# Patient Record
Sex: FEMALE | Race: WHITE | NOT HISPANIC OR LATINO
[De-identification: names, ages, dates, MRNs, and addresses within clinical notes are randomized per-mention and may not be internally consistent; named-entity substitution may affect disease eponyms.]

---

## 2019-01-22 PROBLEM — Z00.00 ENCOUNTER FOR PREVENTIVE HEALTH EXAMINATION: Status: ACTIVE | Noted: 2019-01-22

## 2019-02-04 ENCOUNTER — APPOINTMENT (OUTPATIENT)
Dept: ORTHOPEDIC SURGERY | Facility: CLINIC | Age: 54
End: 2019-02-04
Payer: COMMERCIAL

## 2019-02-04 VITALS
WEIGHT: 170 LBS | DIASTOLIC BLOOD PRESSURE: 94 MMHG | BODY MASS INDEX: 34.27 KG/M2 | HEART RATE: 84 BPM | HEIGHT: 59 IN | SYSTOLIC BLOOD PRESSURE: 155 MMHG

## 2019-02-04 DIAGNOSIS — M22.2X1 PATELLOFEMORAL DISORDERS, RIGHT KNEE: ICD-10-CM

## 2019-02-04 PROCEDURE — 73562 X-RAY EXAM OF KNEE 3: CPT | Mod: RT

## 2019-02-04 PROCEDURE — 99203 OFFICE O/P NEW LOW 30 MIN: CPT

## 2021-04-10 ENCOUNTER — TRANSCRIPTION ENCOUNTER (OUTPATIENT)
Age: 56
End: 2021-04-10

## 2021-04-12 ENCOUNTER — EMERGENCY (EMERGENCY)
Facility: HOSPITAL | Age: 56
LOS: 1 days | Discharge: HOME | End: 2021-04-12
Attending: STUDENT IN AN ORGANIZED HEALTH CARE EDUCATION/TRAINING PROGRAM
Payer: COMMERCIAL

## 2021-04-12 VITALS
SYSTOLIC BLOOD PRESSURE: 172 MMHG | TEMPERATURE: 98 F | HEART RATE: 75 BPM | OXYGEN SATURATION: 98 % | RESPIRATION RATE: 20 BRPM | HEIGHT: 59 IN | WEIGHT: 179.9 LBS | DIASTOLIC BLOOD PRESSURE: 97 MMHG

## 2021-04-12 PROCEDURE — 99284 EMERGENCY DEPT VISIT MOD MDM: CPT

## 2021-04-12 PROCEDURE — 73030 X-RAY EXAM OF SHOULDER: CPT | Mod: 26,LT

## 2021-04-12 PROCEDURE — 73030 X-RAY EXAM OF SHOULDER: CPT

## 2021-04-12 PROCEDURE — 99283 EMERGENCY DEPT VISIT LOW MDM: CPT | Mod: 25

## 2021-04-12 RX ORDER — METHOCARBAMOL 500 MG/1
2 TABLET, FILM COATED ORAL
Qty: 20 | Refills: 0
Start: 2021-04-12 | End: 2021-04-16

## 2021-04-12 RX ADMIN — Medication 60 MILLIGRAM(S): at 14:17

## 2021-04-12 NOTE — ED PROVIDER NOTE - CARE PROVIDER_API CALL
Elbert Becerra)  Orthopaedic Surgery  [address], [address] [facility] [address]  [place] [address]  Phone: [phone]  Fax: [phone]  Follow Up Time:

## 2021-04-12 NOTE — ED PROVIDER NOTE - OBJECTIVE STATEMENT
Patient is a 55 year old female who presents c/o left sided neck pain radiating to arm. patient states pain started on Saturday, no injury recalled.  patient states pain increasing since.   patient was seen at  given muscle relaxant and Motrin, not taking motrin. patient states pain on abduction of left shoulder, unable to fully abduct due to pain.  patient denies chest pain, no dyspnea, no no WINTER

## 2021-04-12 NOTE — ED PROVIDER NOTE - PATIENT PORTAL LINK FT
You can access the FollowMyHealth Patient Portal offered by [facility] by registering at the following website: [email or web address]. By joining [employer]’s FollowMyHealth portal, you will also be able to view your health information using other applications (apps) compatible with our system.

## 2021-04-12 NOTE — ED PROVIDER NOTE - ATTENDING CONTRIBUTION TO CARE
56yo female with pmh of depression presents with left neck pain for 3 days. Pt states the pain is worse with movment of arm, and it comes from neck down the arm, paresthesias as well. Pt went to  was given muscle relaxer and motrin. Pt sttes the pain is mainly when raising arm. Pt denies fevers/chills, ha, loc, focal neuro deficits, cp/sob/palp, cough, abd pain/n/v/d, urinary symptoms, recent travel and sick contacts.  Const: Awake, alert and oriented. In no acute distress. Well appearing.  HEENT: NC/AT. Moist mucous membranes.  Eyes: No scleral icterus. EOMI.  Neck:. Soft and supple. LROM due to pain  Cardiac: Regular rate and regular rhythm. +S1/S2. Peripheral pulses 2+ and symmetric. No LE edema.  Resp: Speaking in full sentences. No evidence of respiratory distress. No wheezes, rales or rhonchi.  Abd: Soft, non-tender, non-distended. Normal bowel sounds in all 4 quadrants. No guarding or rebound.  Msk: Spine midline and non-tender. No CVAT. ttp along trapezius muscle pain on abduction, abduction to 90 degrees   Skin: No rashes, abrasions or lacerations.  Lymph: No cervical lymphadenopathy.  Neuro: Awake, alert & oriented x 3. Moves all extremities symmetrically.  analgesics and reassess, no acute findings on xray

## 2021-04-12 NOTE — ED PROVIDER NOTE - NSFOLLOWUPCLINICS_GEN_ALL_ED_FT
[facility] - [place]  Neurosurgery/Spine  [address], [address]  [place] [address]  Phone: [phone]  Fax:     [facility] - [facility]  Neurosurgery/Spine  [address] [place] [address]  Phone: [phone]  Fax:

## 2021-04-12 NOTE — ED PROVIDER NOTE - PHYSICAL EXAMINATION
positive tenderness along trapezius muscle   positive pain on ROM of neck     left shoulder   pain on abduction, abduction to 90 degrees    sensation intact to UE, strength 5/5

## 2021-04-12 NOTE — ED PROVIDER NOTE - CLINICAL SUMMARY MEDICAL DECISION MAKING FREE TEXT BOX
55 year old female with left sided neck and shoulder pain, pain on ROM and palpation   will xray shoulder

## 2021-04-13 ENCOUNTER — APPOINTMENT (OUTPATIENT)
Dept: PHYSICAL MEDICINE AND REHAB | Facility: CLINIC | Age: 56
End: 2021-04-13
Payer: COMMERCIAL

## 2021-04-13 ENCOUNTER — TRANSCRIPTION ENCOUNTER (OUTPATIENT)
Age: 56
End: 2021-04-13

## 2021-04-13 VITALS
SYSTOLIC BLOOD PRESSURE: 169 MMHG | DIASTOLIC BLOOD PRESSURE: 94 MMHG | HEIGHT: 59 IN | HEART RATE: 72 BPM | RESPIRATION RATE: 16 BRPM | WEIGHT: 180 LBS | TEMPERATURE: 98 F | BODY MASS INDEX: 36.29 KG/M2 | OXYGEN SATURATION: 100 %

## 2021-04-13 DIAGNOSIS — Z78.9 OTHER SPECIFIED HEALTH STATUS: ICD-10-CM

## 2021-04-13 DIAGNOSIS — Z86.79 PERSONAL HISTORY OF OTHER DISEASES OF THE CIRCULATORY SYSTEM: ICD-10-CM

## 2021-04-13 DIAGNOSIS — Z82.62 FAMILY HISTORY OF OSTEOPOROSIS: ICD-10-CM

## 2021-04-13 PROCEDURE — 99204 OFFICE O/P NEW MOD 45 MIN: CPT

## 2021-04-13 PROCEDURE — 99072 ADDL SUPL MATRL&STAF TM PHE: CPT

## 2021-04-13 RX ORDER — LEVOTHYROXINE SODIUM 0.05 MG/1
50 TABLET ORAL
Refills: 0 | Status: ACTIVE | COMMUNITY

## 2021-04-13 RX ORDER — LEVOTHYROXINE SODIUM 0.17 MG/1
TABLET ORAL
Refills: 0 | Status: DISCONTINUED | COMMUNITY
End: 2021-04-13

## 2021-04-13 RX ORDER — BACILLUS COAGULANS/INULIN 1B-250 MG
CAPSULE ORAL
Refills: 0 | Status: ACTIVE | COMMUNITY

## 2021-04-13 RX ORDER — FLUOXETINE HCL 10 MG
TABLET ORAL
Refills: 0 | Status: DISCONTINUED | COMMUNITY
End: 2021-04-13

## 2021-04-13 RX ORDER — FLUOXETINE HYDROCHLORIDE 20 MG/1
20 CAPSULE ORAL
Refills: 0 | Status: ACTIVE | COMMUNITY

## 2021-04-13 NOTE — HISTORY OF PRESENT ILLNESS
[FreeTextEntry1] : 55 y.o. F w/ new onset HTN presents to office w/ c/o left periscapular pain radiating to left shoulder down arm to elbow w/ tingling in 4th/5th digits since last week.  No triggering event or antecedent trauma.  Denies similar sxs right hand.  Pt. went to  on Saturday and was Rx'd SMR then went to [facility] ED and was Rx'd oral prednisone.  C-spine x-rays done.  No MRI.  No P.T. or injections.

## 2021-04-13 NOTE — ASSESSMENT
[FreeTextEntry1] : 55 y.o. F w/ presumed left cervical radiculopathy w/ significant RC weakness.  I spent most of today's visit (30 min) discussing the likely pathogenesis and further non-operative management of the patient's painful spinal condition.  I cautioned patient about taking oral prednisone 2' her recent onset HTN.  She was advised to see her PMD ASAP for consideration of starting antihypertensive med.  May trial low dose gabapentin 300 mg po qhs as tolerated.  Advised to watch for CNS depression/sedation.  Given the patient's profound RC weakness w/o impingement, we will obtain MRI C-spine w/o to r/o cervical HNP/stenosis.  Depending on results of MRI, will then consider starting P.T. vs. SUMI vs. surgical consult.  Pt. is in agreement with plan.  All questions answered.  RTC next week for review of imaging.

## 2021-04-13 NOTE — PHYSICAL EXAM
[FreeTextEntry1] : NAD\par A&Ox3\par Obese\par C-spine ROM: 1 FB chin-to-sternum; 20' extension; 45' R LR; 50' L LR\par Peñaloza's: neg\par Lhermitte's: neg\par Spurling's: neg\par DTR's: depressed B/T/Br\par MMT: 4-/5 LEFT SS/IS/D; 4+/5 Biceps; 5/5 triceps; 5-/5 WrExt\par Sensation: SILT.  Decreased PP LEFT C5 dermatome\par Palpation: diffuse left cervical and sup trap muscle hypertonicity/spasm\par ROM LEFT Shoulder: 160' FF/ABD w/o pain\par Neer's: neg\par Hawkin's: neg\par Drop Arm: neg\par Scarf: deferred\par  \par

## 2021-04-19 ENCOUNTER — NON-APPOINTMENT (OUTPATIENT)
Age: 56
End: 2021-04-19

## 2021-04-22 ENCOUNTER — APPOINTMENT (OUTPATIENT)
Dept: PHYSICAL MEDICINE AND REHAB | Facility: CLINIC | Age: 56
End: 2021-04-22

## 2021-04-29 ENCOUNTER — APPOINTMENT (OUTPATIENT)
Dept: PHYSICAL MEDICINE AND REHAB | Facility: CLINIC | Age: 56
End: 2021-04-29
Payer: COMMERCIAL

## 2021-04-29 VITALS
WEIGHT: 197 LBS | HEIGHT: 59 IN | DIASTOLIC BLOOD PRESSURE: 91 MMHG | RESPIRATION RATE: 14 BRPM | HEART RATE: 74 BPM | OXYGEN SATURATION: 100 % | TEMPERATURE: 98 F | BODY MASS INDEX: 39.72 KG/M2 | SYSTOLIC BLOOD PRESSURE: 154 MMHG

## 2021-04-29 PROCEDURE — 99072 ADDL SUPL MATRL&STAF TM PHE: CPT

## 2021-04-29 PROCEDURE — 99214 OFFICE O/P EST MOD 30 MIN: CPT

## 2021-04-29 RX ORDER — PREDNISONE 50 MG/1
50 TABLET ORAL
Refills: 0 | Status: DISCONTINUED | COMMUNITY
End: 2021-04-29

## 2021-04-29 NOTE — HISTORY OF PRESENT ILLNESS
[FreeTextEntry1] : Ms. NIMA DUDLEY  is a 55 year old female who presents with neck pain. Patient referred by Dr. De La Fuente for consideration of AMY. \par \par Location: Left side of neck\par Onset:Started early 4/2021. Has improved with time. \par Provocation/Palliative:Worse with movement. Better with ice. \par Quality:Aching pain\par Radiation:To left shoulder down to 4th/5th digit\par Severity:3/10\par Timing:Worse in AMs and Evenings, better in midday\par \par Does admit to some numbness over L shoulder but improving.  Denies any associated arm or hand weakness. Denies any loss of bowel/bladder control or any groin numbness.\par Previous medications trialed:Oral steroids, Gabapentin 300mg Qhs with good relief, Mobic\par Previous procedures relevant to complaint:None\par Has tried conservative treatment?:No recent PT\par

## 2021-04-29 NOTE — PHYSICAL EXAM
[FreeTextEntry1] : PE:\par Constitutional: In NAD, calm and cooperative\par HEENT: NCAT, Anicteric sclera, no lid-lag\par Cardio: Extremities appear pink and well perfused, no peripheral edema\par Respiratory: Normal respiratory effort on room air, no accessory muscle use\par Skin: no rashes seen on exposed skin, no visible abrasions\par Psych: Normal affect, intact judgment and insight\par Neuro: Awake, alert and oriented x 3, see below for focused neurological exam\par MSK (Neck):\par 	Inspection: no gross swelling identified\par 	Palpation: Tenderness of the left lower cervical paraspinals\par 	ROM: Pain at end cervical extension more than flexion\par 	Strength: 5/5 strength in bilateral upper extremities except for L shoulder abduction which is 4+/5 due to pain\par 	Reflexes: 1+ Biceps/Brachioradialis reflex bilaterally, Peñaloza’s negative bilaterally\par 	Sensation: Intact to light touch in bilateral upper extremities\par 	Special tests: Spurling’s test equivocal on left, negative on right

## 2021-04-29 NOTE — ASSESSMENT
[FreeTextEntry1] : Ms. NIMA DUDLEY is a 55 year old female who presents with left sided neck and UE pain consistent with a cervical radiculopathy, but much improved over past few weeks with Gabapentin and Mobic. Denies any red flag signs. Will recommend:\par - Discussed with patient the risks (including but not limited to bleeding, infection, nerve damage, etc), benefits and alternatives to an epidural steroid injection for which patient understands. She would like to hold off on the injection for now as she tries PT and continues medications\par - PT 2-3x/week for stretching, strengthening, ROM exercises, HEP and modalities PRN including myofascial release, moist heat, and TENS therapy \par - Will continue Gabapentin/Mobic. Patient aware of side effects. \par \par RTC in 3 weeks. Patient educated on red flag signs including any changes to their bowel/bladder control, groin numbness or new weakness. Patient knows to seek immediate attention by calling 911 or going to nearest ER if these symptoms appear. \par \par I spent a total of 32 minutes on this encounter including documentation, face-to-face time and reviewing prior records.

## 2021-05-04 ENCOUNTER — APPOINTMENT (OUTPATIENT)
Dept: PHYSICAL MEDICINE AND REHAB | Facility: CLINIC | Age: 56
End: 2021-05-04

## 2021-05-20 ENCOUNTER — APPOINTMENT (OUTPATIENT)
Dept: PHYSICAL MEDICINE AND REHAB | Facility: CLINIC | Age: 56
End: 2021-05-20
Payer: COMMERCIAL

## 2021-05-20 VITALS
HEART RATE: 80 BPM | HEIGHT: 59 IN | RESPIRATION RATE: 14 BRPM | WEIGHT: 197 LBS | SYSTOLIC BLOOD PRESSURE: 131 MMHG | TEMPERATURE: 98 F | BODY MASS INDEX: 39.72 KG/M2 | OXYGEN SATURATION: 100 % | DIASTOLIC BLOOD PRESSURE: 84 MMHG

## 2021-05-20 DIAGNOSIS — M54.12 RADICULOPATHY, CERVICAL REGION: ICD-10-CM

## 2021-05-20 DIAGNOSIS — M47.812 SPONDYLOSIS W/OUT MYELOPATHY OR RADICULOPATHY, CERVICAL REGION: ICD-10-CM

## 2021-05-20 DIAGNOSIS — M62.838 OTHER MUSCLE SPASM: ICD-10-CM

## 2021-05-20 PROCEDURE — 99072 ADDL SUPL MATRL&STAF TM PHE: CPT

## 2021-05-20 PROCEDURE — 99213 OFFICE O/P EST LOW 20 MIN: CPT

## 2021-05-20 RX ORDER — GABAPENTIN 300 MG/1
300 CAPSULE ORAL
Qty: 30 | Refills: 2 | Status: DISCONTINUED | COMMUNITY
Start: 2021-04-13 | End: 2021-05-20

## 2021-05-20 RX ORDER — MELOXICAM 7.5 MG/1
7.5 TABLET ORAL
Qty: 30 | Refills: 1 | Status: DISCONTINUED | COMMUNITY
Start: 2021-04-16 | End: 2021-05-20

## 2021-05-20 NOTE — HISTORY OF PRESENT ILLNESS
[FreeTextEntry1] : Ms. NIMA DUDLEY is a 55 year old  female who presents for follow up. At last visit, she was started on PT, and continued on Gabapentin/Mobic. She is no longer on gabapentin/mobic as she does not require it. Only complaint is some mild ROM difficulty with her neck/L shoulder. \par \par Location: Left side of neck\par Onset:Started early 4/2021. Has improved with time. \par Provocation/Palliative:Worse with movement. Better with ice. \par Quality:Aching pain\par Radiation:To left shoulder down to 4th/5th digit\par Severity:No pain at this time\par Timing:Worse in AMs and Evenings, better in midday\par \par No bowel/bladder changes. No groin numbness.

## 2021-05-20 NOTE — PHYSICAL EXAM
[FreeTextEntry1] : PE:\par Constitutional: In NAD, calm and cooperative\par HEENT: NCAT, Anicteric sclera, no lid-lag\par Cardio: Extremities appear pink and well perfused, no peripheral edema\par Respiratory: Normal respiratory effort on room air, no accessory muscle use\par Skin: no rashes seen on exposed skin, no visible abrasions\par Psych: Normal affect, intact judgment and insight\par Neuro: Awake, alert and oriented x 3, see below for focused neurological exam\par MSK (Neck):\par 	Inspection: no gross swelling identified\par 	Palpation: No tenderness of the left  cervical paraspinals\par 	ROM: Minimal pain at end cervical extension more than flexion\par 	Strength: 5/5 strength in bilateral upper extremities \par 	Reflexes: 1+ Biceps/Brachioradialis reflex bilaterally, Peñaloza’s negative bilaterally\par 	Sensation: Intact to light touch in bilateral upper extremities\par 	Special tests: Spurling’s test negative bilaterally

## 2021-05-20 NOTE — ASSESSMENT
[FreeTextEntry1] : Ms. NIMA DUDLEY is a 55 year old female who presents with previous left sided neck and UE pain consistent with a cervical radiculopathy, but much improved over past few weeks with PT. Denies any pain at this time, just some mild ROM restrictions. Denies any red flag signs. Will recommend:\par - Continue PT 2-3x/week for stretching, strengthening, ROM exercises, HEP and modalities PRN including myofascial release, moist heat, and TENS therapy with goal of a HEP in a few weeks\par - She no longer requires any oral medication\par \par RTC as needed. Patient aware of red flag signs including any changes to their bowel/bladder control, groin numbness or new weakness. Patient knows to seek immediate attention by calling 911 or going to nearest ER if these symptoms appear.

## 2022-04-14 ENCOUNTER — TRANSCRIPTION ENCOUNTER (OUTPATIENT)
Age: 57
End: 2022-04-14

## 2022-04-18 ENCOUNTER — TRANSCRIPTION ENCOUNTER (OUTPATIENT)
Age: 57
End: 2022-04-18

## 2022-09-21 ENCOUNTER — NON-APPOINTMENT (OUTPATIENT)
Age: 57
End: 2022-09-21

## 2023-10-09 ENCOUNTER — APPOINTMENT (OUTPATIENT)
Dept: OPHTHALMOLOGY | Facility: CLINIC | Age: 58
End: 2023-10-09
Payer: COMMERCIAL

## 2023-10-09 ENCOUNTER — NON-APPOINTMENT (OUTPATIENT)
Age: 58
End: 2023-10-09

## 2023-10-09 PROCEDURE — 92285 EXTERNAL OCULAR PHOTOGRAPHY: CPT

## 2023-10-09 PROCEDURE — 92004 COMPRE OPH EXAM NEW PT 1/>: CPT

## 2023-10-09 PROCEDURE — 92134 CPTRZ OPH DX IMG PST SGM RTA: CPT

## 2024-10-10 ENCOUNTER — APPOINTMENT (OUTPATIENT)
Dept: OPHTHALMOLOGY | Facility: CLINIC | Age: 59
End: 2024-10-10
Payer: COMMERCIAL

## 2024-10-10 ENCOUNTER — NON-APPOINTMENT (OUTPATIENT)
Age: 59
End: 2024-10-10

## 2024-10-10 PROCEDURE — 92014 COMPRE OPH EXAM EST PT 1/>: CPT

## 2024-10-10 PROCEDURE — 92134 CPTRZ OPH DX IMG PST SGM RTA: CPT

## 2024-10-10 PROCEDURE — 92025 CPTRIZED CORNEAL TOPOGRAPHY: CPT

## 2024-12-12 ENCOUNTER — NON-APPOINTMENT (OUTPATIENT)
Age: 59
End: 2024-12-12

## 2025-01-03 ENCOUNTER — NON-APPOINTMENT (OUTPATIENT)
Age: 60
End: 2025-01-03

## 2025-02-03 ENCOUNTER — NON-APPOINTMENT (OUTPATIENT)
Age: 60
End: 2025-02-03

## 2025-03-04 ENCOUNTER — APPOINTMENT (OUTPATIENT)
Dept: OPHTHALMOLOGY | Facility: CLINIC | Age: 60
End: 2025-03-04
Payer: COMMERCIAL

## 2025-03-04 ENCOUNTER — NON-APPOINTMENT (OUTPATIENT)
Age: 60
End: 2025-03-04

## 2025-03-04 PROCEDURE — 92012 INTRM OPH EXAM EST PATIENT: CPT

## 2025-03-04 PROCEDURE — 92025 CPTRIZED CORNEAL TOPOGRAPHY: CPT

## 2025-03-04 PROCEDURE — 92134 CPTRZ OPH DX IMG PST SGM RTA: CPT

## 2025-08-14 ENCOUNTER — APPOINTMENT (OUTPATIENT)
Dept: OPHTHALMOLOGY | Facility: CLINIC | Age: 60
End: 2025-08-14
Payer: COMMERCIAL

## 2025-08-14 ENCOUNTER — NON-APPOINTMENT (OUTPATIENT)
Age: 60
End: 2025-08-14

## 2025-08-14 PROCEDURE — 92025 CPTRIZED CORNEAL TOPOGRAPHY: CPT

## 2025-08-14 PROCEDURE — 92134 CPTRZ OPH DX IMG PST SGM RTA: CPT

## 2025-08-14 PROCEDURE — 68761 CLOSE TEAR DUCT OPENING: CPT | Mod: E2,E4

## 2025-08-14 PROCEDURE — 92014 COMPRE OPH EXAM EST PT 1/>: CPT | Mod: 25
